# Patient Record
(demographics unavailable — no encounter records)

---

## 2025-06-27 NOTE — PLAN
[FreeTextEntry1] : Pap Hormone profile Pelvic sono as period ends Will follow up after results obtained

## 2025-06-27 NOTE — HISTORY OF PRESENT ILLNESS
[Patient reported PAP Smear was normal] : Patient reported PAP Smear was normal [Condoms] : uses condoms [Monogamous (Male Partner)] : is monogamous with a male partner [Y] : Positive pregnancy history [FreeTextEntry1] : 37 yr old P2 here for annual exam. For past year, patient has been getting bleeding in between periods. May be related to ovulation??? Also has been gaining weight even though she has been very careful with her diet.  [PapSmeardate] : 2023 [LMPDate] : 6/14/25 [PGHxTotal] : 3 [[facility] [[facility] 2 [PGHxABSpont] : 1

## 2025-06-27 NOTE — REVIEW OF SYSTEMS
[Recent Weight Gain (___ Lbs)] : recent [unfilled] ~Ulb weight gain [Negative] : Heme/Lymph [FreeTextEntry8] : irreg bleeding

## 2025-07-25 NOTE — ASSESSMENT
[FreeTextEntry1] : 37-year-old female with 2 weeks of intermittent right sided flank pain and history of kidney stones.  History consistent with her previous kidney stone episodes.  - Ordered CT scan renal protocol stat - will call with results - Discussed depending on the size and position of the stone will determine next steps whether repeat trial of passage or surgical intervention - If stone is small then she can continue a trial of passage with increased oral hydration, pain control, tamsulosin - Will check urinalysis and urine culture today, if there is a concern for infection then she will be prescribed antibiotics.  If there is a stone as well as an infection then she would need to go to the hospital for likely stent placement - discussed can take ibuprofen prn for pain - ER precautions for kidney stones were discussed: if any fevers, chills, severe nausea with vomiting, foul smelling urine should present to the emergency room. Explained if there is a kidney infection with an obstructing stone there is a risk of sepsis. They expressed understanding.

## 2025-07-25 NOTE — HISTORY OF PRESENT ILLNESS
[FreeTextEntry1] : Patient is a 37-year-old female with a history of nephrolithiasis presenting for right flank pain for the last 2 weeks.  She reports she had a back massage done about 2 weeks ago and since then has had right sided flank pain radiating down her right lower back.  After she thought she pinched a nerve from the massage however now it feels more like a kidney stone pain which she has had in the past.  In 2020 she had a obstructing right ureteral stone and developed sepsis that required intervention with urology.  [facility] however wants to switch care to [facility].  She denies any fevers, chills, nausea, vomiting.  Pain currently is at 5 out of 10.  She did have some blood in her urine last month, she went to her gynecologist but the workup was negative.  She has not recently seen any blood in her urine.

## 2025-07-29 NOTE — PLAN
[FreeTextEntry1] : Anxiety and depression Doing well on Lexapro 20  mg. Will continue at current dosage  Health Maintenance Pap up to date f/u 6 months for physical

## 2025-07-29 NOTE — HISTORY OF PRESENT ILLNESS
[Home] : at home, [unfilled] , at the time of the visit. [Other Location: e.g. Home (Enter Location, City,State)___] : at [unfilled] [Telehealth (audio & video)] : This visit was provided via telehealth using real-time 2-way audio visual technology. [Verbal consent obtained from patient] : the patient, [unfilled] [FreeTextEntry1] : f/u anxiety/depression [de-identified] : 36 y/o female with h/o anxiety and depression on lexapro. 20 mg. She feels great on this dose. No side effects  pap 6/25

## 2025-07-29 NOTE — PHYSICAL EXAM
[No Acute Distress] : no acute distress [Well Nourished] : well nourished [Normal Voice/Communication] : normal voice/communication [Normal Affect] : the affect was normal [Normal Insight/Judgement] : insight and judgment were intact